# Patient Record
Sex: MALE | Race: WHITE | NOT HISPANIC OR LATINO | Employment: FULL TIME | URBAN - METROPOLITAN AREA
[De-identification: names, ages, dates, MRNs, and addresses within clinical notes are randomized per-mention and may not be internally consistent; named-entity substitution may affect disease eponyms.]

---

## 2017-01-04 ENCOUNTER — TELEPHONE (OUTPATIENT)
Dept: INTERNAL MEDICINE | Facility: CLINIC | Age: 57
End: 2017-01-04

## 2017-01-04 DIAGNOSIS — F32.A DEPRESSION, UNSPECIFIED DEPRESSION TYPE: Primary | ICD-10-CM

## 2017-01-04 NOTE — TELEPHONE ENCOUNTER
----- Message from [place] Rivas sent at 1/4/2017 12:46 PM EST -----  Contact: [phone]  PT GOT LETTER ABOUT LABS AND WANTS TO COME BACK IN FOR REPEAT TESTOSTERONE.  PLEASE PUT ORDER IN AND LET PT KNOW

## 2017-01-05 ENCOUNTER — LAB (OUTPATIENT)
Dept: INTERNAL MEDICINE | Facility: CLINIC | Age: 57
End: 2017-01-05

## 2017-01-05 DIAGNOSIS — F32.A DEPRESSION, UNSPECIFIED DEPRESSION TYPE: ICD-10-CM

## 2017-01-05 PROCEDURE — 36415 COLL VENOUS BLD VENIPUNCTURE: CPT | Performed by: INTERNAL MEDICINE

## 2017-01-07 LAB
CONV COMMENT: ABNORMAL
TESTOST FREE SERPL-MCNC: 4.7 PG/ML (ref 7.2–24)
TESTOST SERPL-MCNC: 460 NG/DL (ref 348–1197)

## 2017-01-18 DIAGNOSIS — R79.89 LOW TESTOSTERONE LEVEL IN MALE: Primary | ICD-10-CM

## 2017-06-29 ENCOUNTER — OFFICE VISIT (OUTPATIENT)
Dept: INTERNAL MEDICINE | Facility: CLINIC | Age: 57
End: 2017-06-29

## 2017-06-29 VITALS
RESPIRATION RATE: 16 BRPM | BODY MASS INDEX: 32.87 KG/M2 | WEIGHT: 256 LBS | HEART RATE: 68 BPM | TEMPERATURE: 97.1 F | DIASTOLIC BLOOD PRESSURE: 82 MMHG | SYSTOLIC BLOOD PRESSURE: 134 MMHG

## 2017-06-29 DIAGNOSIS — M54.12 CERVICAL RADICULOPATHY: ICD-10-CM

## 2017-06-29 DIAGNOSIS — I25.10 CORONARY ARTERIOSCLEROSIS IN NATIVE ARTERY: Primary | ICD-10-CM

## 2017-06-29 DIAGNOSIS — F32.A DEPRESSION, UNSPECIFIED DEPRESSION TYPE: ICD-10-CM

## 2017-06-29 DIAGNOSIS — I10 ESSENTIAL HYPERTENSION: ICD-10-CM

## 2017-06-29 DIAGNOSIS — E78.5 HYPERLIPIDEMIA, UNSPECIFIED HYPERLIPIDEMIA TYPE: ICD-10-CM

## 2017-06-29 DIAGNOSIS — E78.5 DYSLIPIDEMIA: ICD-10-CM

## 2017-06-29 LAB
ALBUMIN SERPL-MCNC: 4.2 G/DL (ref 3.2–4.8)
ALBUMIN/GLOB SERPL: 1.7 G/DL (ref 1.5–2.5)
ALP SERPL-CCNC: 56 U/L (ref 25–100)
ALT SERPL-CCNC: 21 U/L (ref 7–40)
AST SERPL-CCNC: 17 U/L (ref 0–33)
BILIRUB SERPL-MCNC: 0.6 MG/DL (ref 0.3–1.2)
BUN SERPL-MCNC: 23 MG/DL (ref 9–23)
BUN/CREAT SERPL: 28.8 (ref 7–25)
CALCIUM SERPL-MCNC: 9.5 MG/DL (ref 8.7–10.4)
CHLORIDE SERPL-SCNC: 107 MMOL/L (ref 99–109)
CHOLEST SERPL-MCNC: 110 MG/DL (ref 0–200)
CO2 SERPL-SCNC: 31 MMOL/L (ref 20–31)
CREAT SERPL-MCNC: 0.8 MG/DL (ref 0.6–1.3)
ERYTHROCYTE [DISTWIDTH] IN BLOOD BY AUTOMATED COUNT: 13.9 % (ref 11.3–14.5)
GLOBULIN SER CALC-MCNC: 2.5 GM/DL
GLUCOSE SERPL-MCNC: 89 MG/DL (ref 70–100)
HCT VFR BLD AUTO: 44.1 % (ref 38.9–50.9)
HDLC SERPL-MCNC: 30 MG/DL (ref 40–60)
HGB BLD-MCNC: 14.4 G/DL (ref 13.1–17.5)
LDLC SERPL CALC-MCNC: 65 MG/DL (ref 0–100)
MCH RBC QN AUTO: 30.8 PG (ref 27–31)
MCHC RBC AUTO-ENTMCNC: 32.7 G/DL (ref 32–36)
MCV RBC AUTO: 94.4 FL (ref 80–99)
PLATELET # BLD AUTO: 264 10*3/MM3 (ref 150–450)
POTASSIUM SERPL-SCNC: 4.1 MMOL/L (ref 3.5–5.5)
PROT SERPL-MCNC: 6.7 G/DL (ref 5.7–8.2)
RBC # BLD AUTO: 4.67 10*6/MM3 (ref 4.2–5.76)
SODIUM SERPL-SCNC: 140 MMOL/L (ref 132–146)
TRIGL SERPL-MCNC: 74 MG/DL (ref 0–150)
VLDLC SERPL CALC-MCNC: 14.8 MG/DL
WBC # BLD AUTO: 6.15 10*3/MM3 (ref 3.5–10.8)

## 2017-06-29 PROCEDURE — 99214 OFFICE O/P EST MOD 30 MIN: CPT | Performed by: INTERNAL MEDICINE

## 2017-06-29 RX ORDER — CITALOPRAM 40 MG/1
40 TABLET ORAL DAILY
Qty: 90 TABLET | Refills: 3 | Status: SHIPPED | OUTPATIENT
Start: 2017-06-29 | End: 2017-12-11 | Stop reason: SDUPTHER

## 2017-06-29 RX ORDER — AMLODIPINE BESYLATE 10 MG/1
10 TABLET ORAL DAILY
Qty: 90 TABLET | Refills: 3 | Status: SHIPPED | OUTPATIENT
Start: 2017-06-29 | End: 2017-12-11 | Stop reason: SDUPTHER

## 2017-06-29 RX ORDER — CARVEDILOL 25 MG/1
25 TABLET ORAL 2 TIMES DAILY
Qty: 180 TABLET | Refills: 3 | Status: SHIPPED | OUTPATIENT
Start: 2017-06-29

## 2017-06-29 RX ORDER — ATORVASTATIN CALCIUM 40 MG/1
40 TABLET, FILM COATED ORAL DAILY
Qty: 90 TABLET | Refills: 3 | Status: SHIPPED | OUTPATIENT
Start: 2017-06-29 | End: 2017-10-17 | Stop reason: SDUPTHER

## 2017-06-29 RX ORDER — CYCLOBENZAPRINE HCL 5 MG
5 TABLET ORAL NIGHTLY
Qty: 30 TABLET | Refills: 5 | Status: SHIPPED | OUTPATIENT
Start: 2017-06-29

## 2017-06-29 RX ORDER — LOSARTAN POTASSIUM 100 MG/1
100 TABLET ORAL DAILY
Qty: 90 TABLET | Refills: 3 | Status: SHIPPED | OUTPATIENT
Start: 2017-06-29 | End: 2017-12-11 | Stop reason: SDUPTHER

## 2017-06-29 NOTE — PROGRESS NOTES
Subjective   Osiel Lion is a 57 y.o. male.   Pt is here to follow up on CAD,HPL,HTN,Dyslipidemia and depression.           History of Present Illness   Pt is here to follow up on CAD,HPL,HTN,Dyslipidemia and depression.   He states that all chronic issues are doing well with no acute complaints.             The following portions of the patient's history were reviewed and updated as appropriate: allergies, current medications, past family history, past medical history, past social history, past surgical history and problem list.             Review of Systems   Constitutional: Negative.    HENT: Negative.    Eyes: Negative.    Respiratory: Negative.    Cardiovascular:        See HPI.    Gastrointestinal: Negative.    Endocrine: Negative.    Genitourinary: Negative.    Musculoskeletal: Negative.    Skin: Negative.    Allergic/Immunologic: Negative.    Neurological: Negative.    Hematological: Negative.    Psychiatric/Behavioral:        See HPI.                Objective   Physical Exam   Constitutional: He is oriented to person, place, and time. He appears well-developed and well-nourished.   HENT:   Head: Normocephalic and atraumatic.   Right Ear: External ear normal.   Left Ear: External ear normal.   Nose: Nose normal.   Mouth/Throat: Oropharynx is clear and moist.   Eyes: Conjunctivae and EOM are normal. Pupils are equal, round, and reactive to light.   Neck: Normal range of motion. Neck supple. No tracheal deviation present. No thyromegaly present.   Cardiovascular: Normal rate, regular rhythm, normal heart sounds and intact distal pulses.    Pulmonary/Chest: Effort normal and breath sounds normal.   Abdominal: Soft. Bowel sounds are normal. He exhibits no distension. There is no tenderness.   Neurological: He is alert and oriented to person, place, and time. He has normal reflexes.   Skin: Skin is warm and dry.   Psychiatric: He has a normal mood and affect.   Nursing note and vitals reviewed.                Assessment/Plan  :    Coronary arteriosclerosis in native artery  -     atorvastatin (LIPITOR) 40 MG tablet; Take 1 tablet by mouth Daily. For: Hyperlipidemia    Hyperlipidemia, unspecified hyperlipidemia type  -     Comprehensive Metabolic Panel  -     Lipid Panel  -     CBC (No Diff)    Essential hypertension  -     amLODIPine (NORVASC) 10 MG tablet; Take 1 tablet by mouth Daily. For: Hypertension  -     carvedilol (COREG) 25 MG tablet; Take 1 tablet by mouth 2 (Two) Times a Day. For: Atherosclerotic heart disease of native coronary artery without angina pectoris  -     losartan (COZAAR) 100 MG tablet; Take 1 tablet by mouth Daily. For: Atherosclerotic heart disease of native coronary artery without angina pectoris, Hypertension    Dyslipidemia    Depression, unspecified depression type    Cervical radiculopathy  -     cyclobenzaprine (FLEXERIL) 5 MG tablet; Take 1 tablet by mouth Every Night. For: Left arm pain    Other orders  -     citalopram (CeleXA) 40 MG tablet; Take 1 tablet by mouth Daily.        1.) Check CBC,CMP,lipid panel   2.) Refill chronic meds   3.) Follow up in 6 months

## 2017-10-17 DIAGNOSIS — I25.10 CORONARY ARTERIOSCLEROSIS IN NATIVE ARTERY: ICD-10-CM

## 2017-10-17 DIAGNOSIS — I10 ESSENTIAL HYPERTENSION: ICD-10-CM

## 2017-10-17 RX ORDER — AMLODIPINE BESYLATE 10 MG/1
TABLET ORAL
Qty: 30 TABLET | Refills: 0 | Status: SHIPPED | OUTPATIENT
Start: 2017-10-17 | End: 2017-11-19 | Stop reason: SDUPTHER

## 2017-10-17 RX ORDER — ATORVASTATIN CALCIUM 40 MG/1
TABLET, FILM COATED ORAL
Qty: 30 TABLET | Refills: 0 | Status: SHIPPED | OUTPATIENT
Start: 2017-10-17 | End: 2017-11-19 | Stop reason: SDUPTHER

## 2017-11-19 DIAGNOSIS — I10 ESSENTIAL HYPERTENSION: ICD-10-CM

## 2017-11-19 DIAGNOSIS — I25.10 CORONARY ARTERIOSCLEROSIS IN NATIVE ARTERY: ICD-10-CM

## 2017-11-20 RX ORDER — AMLODIPINE BESYLATE 10 MG/1
TABLET ORAL
Qty: 30 TABLET | Refills: 0 | Status: SHIPPED | OUTPATIENT
Start: 2017-11-20

## 2017-11-20 RX ORDER — ATORVASTATIN CALCIUM 40 MG/1
TABLET, FILM COATED ORAL
Qty: 30 TABLET | Refills: 0 | Status: SHIPPED | OUTPATIENT
Start: 2017-11-20 | End: 2017-12-11 | Stop reason: SDUPTHER

## 2017-12-11 DIAGNOSIS — I10 ESSENTIAL HYPERTENSION: ICD-10-CM

## 2017-12-11 DIAGNOSIS — I25.10 CORONARY ARTERIOSCLEROSIS IN NATIVE ARTERY: ICD-10-CM

## 2017-12-11 RX ORDER — ATORVASTATIN CALCIUM 40 MG/1
TABLET, FILM COATED ORAL
Qty: 90 TABLET | Refills: 0 | Status: SHIPPED | OUTPATIENT
Start: 2017-12-11

## 2017-12-11 RX ORDER — LOSARTAN POTASSIUM 100 MG/1
100 TABLET ORAL DAILY
Qty: 90 TABLET | Refills: 3 | Status: SHIPPED | OUTPATIENT
Start: 2017-12-11

## 2017-12-11 RX ORDER — CITALOPRAM 40 MG/1
40 TABLET ORAL DAILY
Qty: 90 TABLET | Refills: 0 | Status: SHIPPED | OUTPATIENT
Start: 2017-12-11

## 2017-12-11 RX ORDER — AMLODIPINE BESYLATE 10 MG/1
10 TABLET ORAL DAILY
Qty: 90 TABLET | Refills: 3 | Status: SHIPPED | OUTPATIENT
Start: 2017-12-11